# Patient Record
Sex: FEMALE | Race: WHITE | URBAN - METROPOLITAN AREA
[De-identification: names, ages, dates, MRNs, and addresses within clinical notes are randomized per-mention and may not be internally consistent; named-entity substitution may affect disease eponyms.]

---

## 2022-01-25 ENCOUNTER — OFFICE VISIT (OUTPATIENT)
Dept: FAMILY MEDICINE CLINIC | Facility: CLINIC | Age: 37
End: 2022-01-25
Payer: COMMERCIAL

## 2022-01-25 VITALS
SYSTOLIC BLOOD PRESSURE: 102 MMHG | BODY MASS INDEX: 30.25 KG/M2 | HEART RATE: 83 BPM | HEIGHT: 64 IN | DIASTOLIC BLOOD PRESSURE: 68 MMHG | WEIGHT: 177.19 LBS

## 2022-01-25 DIAGNOSIS — Z00.00 ROUTINE HEALTH MAINTENANCE: Primary | ICD-10-CM

## 2022-01-25 DIAGNOSIS — C44.320 SQUAMOUS CELL CARCINOMA OF FACE: ICD-10-CM

## 2022-01-25 DIAGNOSIS — G56.03 BILATERAL CARPAL TUNNEL SYNDROME: ICD-10-CM

## 2022-01-25 LAB
ALBUMIN SERPL-MCNC: 3.8 G/DL (ref 3.4–5)
ALBUMIN/GLOB SERPL: 1 {RATIO} (ref 1–2)
ALP LIVER SERPL-CCNC: 46 U/L
ALT SERPL-CCNC: 21 U/L
ANION GAP SERPL CALC-SCNC: 3 MMOL/L (ref 0–18)
AST SERPL-CCNC: 15 U/L (ref 15–37)
BILIRUB SERPL-MCNC: 0.2 MG/DL (ref 0.1–2)
BUN BLD-MCNC: 9 MG/DL (ref 7–18)
BUN/CREAT SERPL: 14.3 (ref 10–20)
CALCIUM BLD-MCNC: 9.2 MG/DL (ref 8.5–10.1)
CHLORIDE SERPL-SCNC: 107 MMOL/L (ref 98–112)
CHOLEST SERPL-MCNC: 193 MG/DL (ref ?–200)
CO2 SERPL-SCNC: 29 MMOL/L (ref 21–32)
CREAT BLD-MCNC: 0.63 MG/DL
FASTING PATIENT LIPID ANSWER: NO
FASTING STATUS PATIENT QL REPORTED: NO
GLOBULIN PLAS-MCNC: 4 G/DL (ref 2.8–4.4)
GLUCOSE BLD-MCNC: 96 MG/DL (ref 70–99)
HDLC SERPL-MCNC: 56 MG/DL (ref 40–59)
LDLC SERPL CALC-MCNC: 122 MG/DL (ref ?–100)
NONHDLC SERPL-MCNC: 137 MG/DL (ref ?–130)
OSMOLALITY SERPL CALC.SUM OF ELEC: 287 MOSM/KG (ref 275–295)
POTASSIUM SERPL-SCNC: 3.7 MMOL/L (ref 3.5–5.1)
PROT SERPL-MCNC: 7.8 G/DL (ref 6.4–8.2)
SODIUM SERPL-SCNC: 139 MMOL/L (ref 136–145)
TRIGL SERPL-MCNC: 85 MG/DL (ref 30–149)
TSI SER-ACNC: 1.09 MIU/ML (ref 0.36–3.74)
VLDLC SERPL CALC-MCNC: 15 MG/DL (ref 0–30)

## 2022-01-25 PROCEDURE — 99385 PREV VISIT NEW AGE 18-39: CPT | Performed by: FAMILY MEDICINE

## 2022-01-25 PROCEDURE — 3078F DIAST BP <80 MM HG: CPT | Performed by: FAMILY MEDICINE

## 2022-01-25 PROCEDURE — 3008F BODY MASS INDEX DOCD: CPT | Performed by: FAMILY MEDICINE

## 2022-01-25 PROCEDURE — 3074F SYST BP LT 130 MM HG: CPT | Performed by: FAMILY MEDICINE

## 2022-01-25 NOTE — PROGRESS NOTES
Yessica Aparicio is a 29year old female. Patient presents with:  Routine Physical      HPI:   Patient is a 43-year-old female presents today for routine health care. Patient has a lesion on her face and looks like a squamous cell carcinoma.   Patient is com no cyanosis, or edema    ASSESSMENT AND PLAN:   1. Bilateral carpal tunnel syndrome  Patient just started using braces when she sleeps that she will do that for 1 to 2 weeks and will make an appointment with the hand surgeon if needed.   - PLASTIC SURGERY -

## 2022-02-02 ENCOUNTER — OFFICE VISIT (OUTPATIENT)
Dept: SURGERY | Facility: CLINIC | Age: 37
End: 2022-02-02
Payer: COMMERCIAL

## 2022-02-02 VITALS
WEIGHT: 177 LBS | SYSTOLIC BLOOD PRESSURE: 110 MMHG | BODY MASS INDEX: 30.22 KG/M2 | DIASTOLIC BLOOD PRESSURE: 62 MMHG | HEIGHT: 64 IN

## 2022-02-02 DIAGNOSIS — L98.9 LESION OF SKIN OF FACE: ICD-10-CM

## 2022-02-02 DIAGNOSIS — D22.4 ATYPICAL MOLE OF NECK: Primary | ICD-10-CM

## 2022-02-02 PROCEDURE — 3078F DIAST BP <80 MM HG: CPT | Performed by: SURGERY

## 2022-02-02 PROCEDURE — 3008F BODY MASS INDEX DOCD: CPT | Performed by: SURGERY

## 2022-02-02 PROCEDURE — 11642 EXC F/E/E/N/L MAL+MRG 1.1-2: CPT | Performed by: SURGERY

## 2022-02-02 PROCEDURE — 3074F SYST BP LT 130 MM HG: CPT | Performed by: SURGERY

## 2022-02-02 PROCEDURE — 11421 EXC H-F-NK-SP B9+MARG 0.6-1: CPT | Performed by: SURGERY

## 2022-02-02 PROCEDURE — 99203 OFFICE O/P NEW LOW 30 MIN: CPT | Performed by: SURGERY

## 2022-02-02 NOTE — PROCEDURES
Surgery procedure note    Timeout performed and consent signed. Face prepped and draped with Betadine in a sterile fashion. 1% lidocaine with epinephrine is infiltrated circumferentially. The 1.5 cm lesion is marked with a skin scribe an elliptical incision is made with a conservative margin. Specimen oriented sent to pathology. The wound irrigated closed using interrupted 4-0 Prolene. Spot Band-Aid applied. Next the left neck prepped and draped with Betadine in a sterile fashion. Elliptical incision is made and a 1 cm atypical mole excised. Hemostasis maintained with compression incision closed using a single 4-0 Prolene. Sterile Band-Aid applied. She tolerated the procedure well.

## 2022-02-08 ENCOUNTER — OFFICE VISIT (OUTPATIENT)
Dept: SURGERY | Facility: CLINIC | Age: 37
End: 2022-02-08
Payer: COMMERCIAL

## 2022-02-08 DIAGNOSIS — T78.40XA ALLERGIC REACTION, INITIAL ENCOUNTER: ICD-10-CM

## 2022-02-08 DIAGNOSIS — C44.310 BASAL CELL CARCINOMA (BCC) OF SKIN OF FACE, UNSPECIFIED PART OF FACE: Primary | ICD-10-CM

## 2022-02-08 PROCEDURE — 99024 POSTOP FOLLOW-UP VISIT: CPT | Performed by: SURGERY

## 2022-02-08 NOTE — PROGRESS NOTES
Surgery progress note    Patient presents for follow-up discussion regarding the facial skin cancer that was excised. Pathology reveals basal cell carcinoma with close margin at 12 and 3:00. This will need to be reexcised. Plan to remove sutures today in follow-up for office excision. Extensive allergic reactions at both excision sites most likely secondary to the topical antibiotic. She was instructed to stop this to Claritin and use hydrocortisone ointment. Follow-up in 1 week.

## 2022-02-15 ENCOUNTER — OFFICE VISIT (OUTPATIENT)
Dept: SURGERY | Facility: CLINIC | Age: 37
End: 2022-02-15
Payer: COMMERCIAL

## 2022-02-15 VITALS — BODY MASS INDEX: 30.22 KG/M2 | HEIGHT: 64 IN | WEIGHT: 177 LBS

## 2022-02-15 DIAGNOSIS — Z98.890 POST-OPERATIVE STATE: Primary | ICD-10-CM

## 2022-02-15 PROCEDURE — 99213 OFFICE O/P EST LOW 20 MIN: CPT | Performed by: SURGERY

## 2022-02-15 NOTE — PROGRESS NOTES
Postoperative Patient Follow-up      2/15/2022    [facility]      Yessica Aparicio is a 29year old female postop excision of basal cell carcinoma of the face. She had a allergic reaction to the topical antibiotic that she was applying. She is here for wound evaluation. Margin at 12 and 3:00 were also close and requires reexcision. Exam  Rash is much improved.   Incision healing well      Assessment/Plan  Assessment   Post-operative state  (primary encounter diagnosis)    Follow-up in a few weeks when rash is completely healed for reexcision of facial basal cell         Diamond Koenig MD

## 2022-03-15 ENCOUNTER — OFFICE VISIT (OUTPATIENT)
Dept: SURGERY | Facility: CLINIC | Age: 37
End: 2022-03-15
Payer: COMMERCIAL

## 2022-03-15 VITALS
DIASTOLIC BLOOD PRESSURE: 54 MMHG | SYSTOLIC BLOOD PRESSURE: 104 MMHG | WEIGHT: 177 LBS | BODY MASS INDEX: 30.22 KG/M2 | HEIGHT: 64 IN

## 2022-03-15 DIAGNOSIS — C44.319 BASAL CELL CARCINOMA (BCC) OF SKIN OF OTHER PART OF FACE: Primary | ICD-10-CM

## 2022-03-15 PROCEDURE — 12051 INTMD RPR FACE/MM 2.5 CM/<: CPT | Performed by: SURGERY

## 2022-03-15 PROCEDURE — 11641 EXC F/E/E/N/L MAL+MRG 0.6-1: CPT | Performed by: SURGERY

## 2022-03-15 NOTE — PROCEDURES
Surgery procedure note    Patient is here for reexcision of basal cell carcinoma of the right medial cheek. Informed consent obtained. Timeout performed and face prepped with pHisoHex. Lidocaine with epinephrine is infiltrated. An elliptical incision is made encompassing the area of concern and old scar. The flaps are elevated the wound closed in a layered fashion using interrupted 4-0 Monocryl and fine Steri-Strips. Tolerated the procedure well. Area of excision measures 1 cm x 8 mm.

## 2022-03-16 PROCEDURE — 88305 TISSUE EXAM BY PATHOLOGIST: CPT | Performed by: SURGERY

## 2022-03-18 ENCOUNTER — TELEPHONE (OUTPATIENT)
Dept: SURGERY | Facility: CLINIC | Age: 37
End: 2022-03-18

## 2022-03-18 NOTE — TELEPHONE ENCOUNTER
Per pt her bandage came off her face and states a flap of skin came off and asking if it is ok to wait until Monday.  Please advise

## 2022-03-18 NOTE — TELEPHONE ENCOUNTER
Spoke to patient. Keep incision site clean, and cover with antibiotic ointment and bandaid. Scheduled apt to RTC to evaluate incision site on Tuesday @ 7:45 am in OP. Offered apt for Monday, but patient prefers OP location.

## 2022-03-22 ENCOUNTER — OFFICE VISIT (OUTPATIENT)
Dept: SURGERY | Facility: CLINIC | Age: 37
End: 2022-03-22
Payer: COMMERCIAL

## 2022-03-22 VITALS — BODY MASS INDEX: 30.22 KG/M2 | HEIGHT: 64 IN | WEIGHT: 177 LBS

## 2022-03-22 DIAGNOSIS — Z98.890 POST-OPERATIVE STATE: Primary | ICD-10-CM

## 2022-03-22 PROCEDURE — 99024 POSTOP FOLLOW-UP VISIT: CPT | Performed by: SURGERY

## 2022-03-22 PROCEDURE — 3008F BODY MASS INDEX DOCD: CPT | Performed by: SURGERY

## 2022-03-22 NOTE — PROGRESS NOTES
Postoperative Patient Follow-up      3/22/2022    [facility]      Adalberto Silva is a 29year old female postop after reexcision basal cell carcinoma of the face. Pathology checked and no residual cancer.       Exam  Incision is clean dry intact      Assessment/Plan  Assessment   Post-operative state  (primary encounter diagnosis)    Follow-up for problem         Susan Pitt MD

## 2022-04-05 ENCOUNTER — TELEPHONE (OUTPATIENT)
Dept: SURGERY | Facility: CLINIC | Age: 37
End: 2022-04-05

## 2022-04-06 ENCOUNTER — OFFICE VISIT (OUTPATIENT)
Dept: SURGERY | Facility: CLINIC | Age: 37
End: 2022-04-06
Payer: COMMERCIAL

## 2022-04-06 VITALS — HEIGHT: 64 IN | WEIGHT: 177 LBS | BODY MASS INDEX: 30.22 KG/M2

## 2022-04-06 DIAGNOSIS — T81.41XA POSTOPERATIVE STITCH ABSCESS: Primary | ICD-10-CM

## 2022-04-06 PROCEDURE — 99212 OFFICE O/P EST SF 10 MIN: CPT | Performed by: SURGERY

## 2022-04-06 PROCEDURE — 3008F BODY MASS INDEX DOCD: CPT | Performed by: SURGERY

## 2022-04-06 NOTE — PROGRESS NOTES
Surgery progress note    Tristan Shanks presents with a stitch abscess of the face after excision of a basal cell carcinoma. A 4-0 Monocryl is removed and a scant amount of pus drained from around the site. She has hypersensitivity to antibiotics and will treat with daily dressing changes. She tolerated this well.   Follow-up  As needed

## 2022-10-14 ENCOUNTER — OFFICE VISIT (OUTPATIENT)
Dept: FAMILY MEDICINE CLINIC | Facility: CLINIC | Age: 37
End: 2022-10-14
Payer: COMMERCIAL

## 2022-10-14 VITALS
RESPIRATION RATE: 14 BRPM | HEIGHT: 64 IN | SYSTOLIC BLOOD PRESSURE: 120 MMHG | WEIGHT: 194 LBS | DIASTOLIC BLOOD PRESSURE: 72 MMHG | OXYGEN SATURATION: 96 % | BODY MASS INDEX: 33.12 KG/M2 | HEART RATE: 88 BPM

## 2022-10-14 DIAGNOSIS — J06.9 VIRAL UPPER RESPIRATORY TRACT INFECTION: ICD-10-CM

## 2022-10-14 DIAGNOSIS — J06.9 VIRAL UPPER RESPIRATORY TRACT INFECTION: Primary | ICD-10-CM

## 2022-10-14 PROCEDURE — 3008F BODY MASS INDEX DOCD: CPT | Performed by: FAMILY MEDICINE

## 2022-10-14 PROCEDURE — 99213 OFFICE O/P EST LOW 20 MIN: CPT | Performed by: FAMILY MEDICINE

## 2022-10-14 PROCEDURE — 3074F SYST BP LT 130 MM HG: CPT | Performed by: FAMILY MEDICINE

## 2022-10-14 PROCEDURE — 3078F DIAST BP <80 MM HG: CPT | Performed by: FAMILY MEDICINE

## 2022-10-14 RX ORDER — FLUTICASONE PROPIONATE 50 MCG
SPRAY, SUSPENSION (ML) NASAL
Qty: 48 G | Refills: 0 | OUTPATIENT
Start: 2022-10-14

## 2022-10-14 RX ORDER — BENZONATATE 200 MG/1
200 CAPSULE ORAL 3 TIMES DAILY PRN
Qty: 30 CAPSULE | Refills: 0 | Status: SHIPPED | OUTPATIENT
Start: 2022-10-14

## 2022-10-14 RX ORDER — FLUTICASONE PROPIONATE 50 MCG
2 SPRAY, SUSPENSION (ML) NASAL DAILY
Qty: 16 G | Refills: 0 | Status: SHIPPED | OUTPATIENT
Start: 2022-10-14

## 2022-10-14 RX ORDER — CODEINE PHOSPHATE AND GUAIFENESIN 10; 100 MG/5ML; MG/5ML
10 SOLUTION ORAL EVERY 6 HOURS PRN
Qty: 140 ML | Refills: 0 | Status: SHIPPED | OUTPATIENT
Start: 2022-10-14

## 2024-02-02 ENCOUNTER — HOSPITAL ENCOUNTER (OUTPATIENT)
Age: 39
Discharge: HOME | End: 2024-02-02
Payer: COMMERCIAL

## 2024-02-02 VITALS
SYSTOLIC BLOOD PRESSURE: 125 MMHG | RESPIRATION RATE: 18 BRPM | DIASTOLIC BLOOD PRESSURE: 75 MMHG | TEMPERATURE: 99 F | HEART RATE: 99 BPM | OXYGEN SATURATION: 100 %

## 2024-02-02 DIAGNOSIS — Z20.822 ENCOUNTER FOR LABORATORY TESTING FOR COVID-19 VIRUS: ICD-10-CM

## 2024-02-02 DIAGNOSIS — U07.1 COVID-19 VIRUS DETECTED: Primary | ICD-10-CM

## 2024-02-02 LAB — SARS-COV-2 RNA RESP QL NAA+PROBE: DETECTED

## 2024-02-02 PROCEDURE — 99203 OFFICE O/P NEW LOW 30 MIN: CPT | Performed by: NURSE PRACTITIONER

## 2024-02-02 PROCEDURE — U0002 COVID-19 LAB TEST NON-CDC: HCPCS | Performed by: NURSE PRACTITIONER

## 2024-02-02 NOTE — ED INITIAL ASSESSMENT (HPI)
Pt here with complaints of cough , body aches, and headaches that began 1 day ago , pt states she tested +covid at home today , pt denies any fevers

## 2024-02-02 NOTE — ED PROVIDER NOTES
Patient Seen in: [facility]      History   No chief complaint on file.    Stated Complaint: Covid +    Subjective:   Well-appearing 38-year-old female with no significant past medical history presents with complaints of a cough, body aches and intermittent headaches that started yesterday.  Patient communicates taking Tylenol for symptoms with relief.  Patient communicates that today morning she tested positive for COVID-19 on an  home test.  Patient denies fever or chills.  Patient denies chest pain or shortness of breath.              Objective:   History reviewed. No pertinent past medical history.           Past Surgical History:   Procedure Laterality Date    APPENDECTOMY      Age 17      2019    33 wks gestation       2022                Social History     Socioeconomic History    Marital status:     Number of children: 2   Tobacco Use    Smoking status: Never    Smokeless tobacco: Never   Vaping Use    Vaping Use: Never used   Substance and Sexual Activity    Alcohol use: Yes     Comment: \"couple glasses a month\"    Drug use: Never              Review of Systems    Positive for stated complaint: Covid +  Other systems are as noted in HPI.  Constitutional and vital signs reviewed.      All other systems reviewed and negative except as noted above.    Physical Exam     ED Triage Vitals   BP 24 0816 125/75   Pulse 24 0816 99   Resp 24 0816 18   Temp 24 0818 98.5 °F (36.9 °C)   Temp src --    SpO2 24 0816 100 %   O2 Device 24 0833 None (Room air)       Current:/75   Pulse 99   Temp 98.5 °F (36.9 °C)   Resp 18   LMP 2024   SpO2 100%       Physical Exam  VS: Vital signs reviewed. 02 saturation within normal limits for this patient.    General: Patient is awake and alert, oriented to person, place and time. Pt appears non-toxic.     HEENT: Head is normocephalic, atraumatic.  Nonicteric sclera, no  conjunctival injection. No facial droop or slurred speech. No oral lesions or pallor. Mucous membranes moist.     Neck: No cervical lymphadenopathy. Supple. Normal ROM.    Heart: Regular rate and rhythm, normal S1, normal S2.    Lungs: Clear to auscultation. Good inspiratory effort. + Airway entry bilaterally without wheezes, rhonchi or crackles. No accessory muscle use or tachypnea.    Skin: Warm, dry and normal in color.     Psychiatric: Normal affect, judgement normal, insight normal.     CNS: Moves all 4 extremities. Interacts appropriately. No gait abnormality. Memory normal.        ED Course     Labs Reviewed   RAPID SARS-COV-2 BY PCR - Abnormal; Notable for the following components:       Result Value    Rapid SARS-CoV-2 by PCR Detected (*)     All other components within normal limits     MDM   Medical Decision Making  Well-appearing.  Rapid SARS-CoV-2 positive.   O2 saturation 100%.  No respiratory distress.    COVID-19 counseling performed.  I discussed quarantine precautions.  I reviewed the signs and symptoms of COVID-19.  I discussed when to seek higher level of care and social isolation.    Patient has been advised that they should continue to self isolate and use infection control measures (e.g. wear mask, isolate, social distance, avoid sharing personal items, clean and disinfect high touch surfaces, and frequent handwashing).     Paxlovid was prescribed.  CMP from January 25, 2022 within normal limits.  I discussed rebound effects with Paxlovid, patient verbalized understanding.  Continue acetaminophen and/or ibuprofen as needed for pain or discomfort.    Differential diagnosis considered were viral URI versus influenza versus COVID-19.    Return if any concerns.      Problems Addressed:  COVID-19 virus detected: acute illness or injury  Encounter for laboratory testing for COVID-19 virus: acute illness or injury    Amount and/or Complexity of Data Reviewed  Labs: ordered. Decision-making details  documented in ED Course.    Risk  OTC drugs.  Prescription drug management.        Disposition and Plan     Clinical Impression:  1. COVID-19 virus detected    2. Encounter for laboratory testing for COVID-19 virus         Disposition:  Discharge  2/2/2024  8:32 am    Follow-up:  Ina Oreilly MD  [address] [place] [id number]  [phone]    In 1 week  As needed          Medications Prescribed:  Discharge Medication List as of 2/2/2024  8:33 AM        START taking these medications    Details   nirmatrelvir-ritonavir 300-100 MG Oral Tablet Therapy Pack Take two nirmatrelvir tablets (300mg) with one ritonavir tablet (100mg) together twice daily for 5 days., Normal, Disp-30 tablet, R-0

## 2024-10-02 ENCOUNTER — OFFICE VISIT (OUTPATIENT)
Dept: INTERNAL MEDICINE CLINIC | Facility: CLINIC | Age: 39
End: 2024-10-02
Payer: COMMERCIAL

## 2024-10-02 VITALS
BODY MASS INDEX: 35 KG/M2 | HEIGHT: 64 IN | WEIGHT: 205 LBS | DIASTOLIC BLOOD PRESSURE: 70 MMHG | OXYGEN SATURATION: 98 % | HEART RATE: 75 BPM | SYSTOLIC BLOOD PRESSURE: 120 MMHG | TEMPERATURE: 98 F | RESPIRATION RATE: 18 BRPM

## 2024-10-02 DIAGNOSIS — Z13.29 SCREENING FOR ENDOCRINE, METABOLIC AND IMMUNITY DISORDER: ICD-10-CM

## 2024-10-02 DIAGNOSIS — Z13.228 SCREENING FOR ENDOCRINE, METABOLIC AND IMMUNITY DISORDER: ICD-10-CM

## 2024-10-02 DIAGNOSIS — Z13.29 SCREENING FOR THYROID DISORDER: ICD-10-CM

## 2024-10-02 DIAGNOSIS — Z13.220 SCREENING FOR LIPID DISORDERS: ICD-10-CM

## 2024-10-02 DIAGNOSIS — Z00.00 PHYSICAL EXAM, ANNUAL: Primary | ICD-10-CM

## 2024-10-02 DIAGNOSIS — Z13.1 SCREENING FOR DIABETES MELLITUS: ICD-10-CM

## 2024-10-02 DIAGNOSIS — Z11.59 NEED FOR HEPATITIS B SCREENING TEST: ICD-10-CM

## 2024-10-02 DIAGNOSIS — Z12.4 SCREENING FOR MALIGNANT NEOPLASM OF CERVIX: ICD-10-CM

## 2024-10-02 DIAGNOSIS — Z13.0 SCREENING FOR DEFICIENCY ANEMIA: ICD-10-CM

## 2024-10-02 DIAGNOSIS — Z11.59 NEED FOR HEPATITIS C SCREENING TEST: ICD-10-CM

## 2024-10-02 DIAGNOSIS — Z13.0 SCREENING FOR ENDOCRINE, METABOLIC AND IMMUNITY DISORDER: ICD-10-CM

## 2024-10-02 DIAGNOSIS — E55.9 VITAMIN D DEFICIENCY: ICD-10-CM

## 2024-10-02 DIAGNOSIS — Z11.4 SCREENING FOR HIV (HUMAN IMMUNODEFICIENCY VIRUS): ICD-10-CM

## 2024-10-02 PROCEDURE — 87624 HPV HI-RISK TYP POOLED RSLT: CPT | Performed by: STUDENT IN AN ORGANIZED HEALTH CARE EDUCATION/TRAINING PROGRAM

## 2024-10-02 PROCEDURE — 88175 CYTOPATH C/V AUTO FLUID REDO: CPT | Performed by: STUDENT IN AN ORGANIZED HEALTH CARE EDUCATION/TRAINING PROGRAM

## 2024-10-02 NOTE — PROGRESS NOTES
[facility]    CHIEF COMPLAINT:   Chief Complaint   Patient presents with    Routine Physical     New patient         HPI:   Cleopatra Fernando is a 39 year old female who presents for a complete physical exam.      Patient is originally from [place], lived in several other states, moved to [place] about 2-1/2 years ago.  She is  for the past 8 years, has 5-year-old daughter and 3-year-old son.    Her GAD7 - 7 and PHQ9 - 5 in the office today, however patient denies any interference or problems in her daily life.    Diet: Heathy    Exercise:  4 days a week 1 hour each (orange theory)  Eye exam: every 2 years, wears glasses, no contacts   Dentist: 2 years ago. Due, currently searching for dentist  Driving: yes, with the seatbelt  Sunscreen: yes throughout the year  Smoking: Never  Alcohol: Occasionally  No other substance use.   Periods irregular, last  5-9 days, every  4-8 weeks, heavy on the first few days, no bleeding/spotting between periods.  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  Both deliveries were with . Fallopian tubes were removed during last .    Vaccinations:  Influenza: Got it done [facility] today 10/02/2024  COVID: Vaccinated  x 6, got it today in [facility] 10/02/2024  Tdap/Td: Had it done during pregnancy about 3 years ago    Screenings:  Mammogram:   Colonoscopy screen: Dad had (carcinoid tumor) around 64 yo, night need colonoscopy at 42 yo  PAP: about 3 years ago, will repeat in the office today.    Wt Readings from Last 6 Encounters:   10/02/24 205 lb (93 kg)   10/14/22 194 lb (88 kg)   22 177 lb (80.3 kg)   22 177 lb (80.3 kg)   03/15/22 177 lb (80.3 kg)   02/15/22 177 lb (80.3 kg)     Body mass index is 35.19 kg/m².       No current outpatient medications on file.      History reviewed. No pertinent past medical history.   Past Surgical History:   Procedure Laterality Date    Appendectomy       Age 17      2019    33 wks gestation       2022      Family History   Problem Relation Age of Onset    Cancer Father     Diabetes Maternal Grandmother            REVIEW OF SYSTEMS:   GENERAL: feels well otherwise  SKIN: denies any unusual skin lesions  EYES:denies blurred vision or double vision  HEENT: denies nasal congestion, sinus pain or ST  LUNGS: denies shortness of breath with exertion  CARDIOVASCULAR: denies chest pain on exertion  GI: denies abdominal pain,denies heartburn  : denies dysuria, vaginal discharge or itching,   MUSCULOSKELETAL: denies back pain  NEURO: denies headaches  PSYCHE: denies depression or anxiety  HEMATOLOGIC: denies hx of anemia  ENDOCRINE: denies thyroid history  ALL/ASTHMA: denies hx of allergy or asthma    EXAM:   /70 (BP Location: Right arm, Patient Position: Sitting, Cuff Size: adult)   Pulse 75   Temp 98.1 °F (36.7 °C)   Resp 18   Ht 5' 4\" (1.626 m)   Wt 205 lb (93 kg)   LMP 2024   SpO2 98%   BMI 35.19 kg/m²   Body mass index is 35.19 kg/m².   GENERAL: well developed, well nourished,in no apparent distress  SKIN: no rashes,no suspicious lesions  HEENT: atraumatic, normocephalic,ears and throat are clear  EYES:PERRLA, conjunctiva are clear  NECK: supple,no adenopathy  LUNGS: clear to auscultation  CARDIO: nl s1 and s2, RRR without murmur  GI: good BS's,no masses, HSM or tenderness  BREAST: no dominant or suspicious mass, no nipple discharge  GENITAL/URINARY:  External Genitalia:  General appearance; normal, Hair distribution; normal, Lesions absent, Urethral Meatus:  Size normal, Location normal, Lesions absent, Prolapse absent, Vagina:  General appearance normal, Lesions absent, Pelvic support normal, Cervix:  General appearance normal, Discharge absent, Tenderness absent, Enlargement absent, Uterus:  Masses absent, Adnexa:  Masses absent, Tenderness absent, Anus/Perineum:  Lesions absent and Masses absent  MUSCULOSKELETAL:  back is not tender,FROM of the back  EXTREMITIES: no cyanosis, clubbing or edema  NEURO: Oriented times three,cranial nerves are intact,motor and sensory are grossly intact    Labs:   No results found for: \"WBC\", \"HGB\", \"PLT\"   Lab Results   Component Value Date/Time    GLU 96 01/25/2022 05:12 PM     01/25/2022 05:12 PM    K 3.7 01/25/2022 05:12 PM     01/25/2022 05:12 PM    CO2 29.0 01/25/2022 05:12 PM    CREATSERUM 0.63 01/25/2022 05:12 PM    CA 9.2 01/25/2022 05:12 PM    ALB 3.8 01/25/2022 05:12 PM    TP 7.8 01/25/2022 05:12 PM    ALKPHO 46 01/25/2022 05:12 PM    AST 15 01/25/2022 05:12 PM    ALT 21 01/25/2022 05:12 PM    BILT 0.2 01/25/2022 05:12 PM    TSH 1.090 01/25/2022 05:12 PM        Lab Results   Component Value Date/Time    CHOLEST 193 01/25/2022 05:12 PM    HDL 56 01/25/2022 05:12 PM    TRIG 85 01/25/2022 05:12 PM     (H) 01/25/2022 05:12 PM    NONHDLC 137 (H) 01/25/2022 05:12 PM       No results found for: \"A1C\"   Vitamin D:    No results found for: \"VITD\"        ASSESSMENT AND PLAN:   Cleopatra Fernando is a 39 year old female who presents for a complete physical exam.     1. Physical exam, annual  Pap, pelvic, and breast exam done in the office today. Self breast exam explained. Pt' s weight is Body mass index is 35.19 kg/m². Recommended regular exercise.   Age appropriate screenings discussed and orders placed.  The patient indicates understanding of these issues and agrees to the plan.  Annual eye exam and Q 6 month dental exam recommended    2. Screening for deficiency anemia  - CBC With Differential With Platelet; Future    3. Screening for endocrine, metabolic and immunity disorder  - Comp Metabolic Panel (14); Future    4. Screening for lipid disorders  - Lipid Panel; Future    5. Screening for diabetes mellitus  - Hemoglobin A1C; Future    6. Screening for thyroid disorder  - TSH W Reflex To Free T4; Future    7. Vitamin D deficiency  - Vitamin D; Future    8. Need for hepatitis C  screening test  - HCV Antibody; Future    9. Need for hepatitis B screening test  - Hepatitis B Surface Antibody; Future  - Hepatitis B Surface Antigen; Future    10. Screening for HIV (human immunodeficiency virus)  - HIV Ag/Ab Combo; Future       Return in about 4 weeks (around 10/30/2024) for For weight loss appointment.    Sima Crowder MD

## 2024-10-08 LAB
.: NORMAL
.: NORMAL
HPV E6+E7 MRNA CVX QL NAA+PROBE: NEGATIVE

## 2025-04-30 NOTE — PROGRESS NOTES
OFFICE NOTE       The following individual(s) verbally consented to be recorded using ambient AI listening technology and understand that they can each withdraw their consent to this listening technology at any point by asking the clinician to turn off or pause the recording:    Patient name: Cleopatra Fernando          Patient ID: Cleopatra Fernando is a 39 year old female.  Today's Date: 04/30/25  Chief Complaint: Cough (Started almost 2 weeks ago, denies any congestion or fevers, pt states she's been taking nyquil ) and Weight Loss (Pt would like to discuss weight loss options )      History of Present Illness  Cleopatra Fernando is a 39 year old female who presents with a persistent cough following a viral infection.    She has been experiencing a persistent cough that began after a cold contracted the weekend before Easter. The initial infection included fever and lethargy, which resolved within 24 to 48 hours, but the cough has persisted since then.    Initially, the cough was slightly productive but has since become non-productive and is described as 'annoying'. It is most pronounced at night, often waking her from sleep, but continues throughout the day. She has tried Nyquil at night, which may have helped slightly, but she still wakes up during the night.    No chest tightness, shortness of breath, wheezing, chest pain, abdominal pain, nausea, vomiting, diarrhea, constipation, ear congestion, pain, or hearing loss. She reports a sore throat due to coughing, but her voice has returned after a brief period of loss during the initial infection. She notes a little nasal congestion but nothing out of the ordinary, and denies any eye itchiness, redness, or vision changes. She has not experienced any fatigue, fevers, chills, appetite changes, or rash. Her urination is normal, and she has no muscle or joint aches.    She also wants to discuss medications for weight loss. She has been training  in the gym regularly, has been counting calories using [employer] norberto ranging 1500- 1800 calories a day. She has not lost much weight and would like to investigate options to help her lose weight.       Vitals:    04/30/25 0828   BP: 118/62   Pulse: 75   Resp: 18   Temp: 98 °F (36.7 °C)   SpO2: 99%   Weight: 195 lb (88.5 kg)   Height: 5' 4\" (1.626 m)     body mass index is 33.47 kg/m².  BP Readings from Last 3 Encounters:   04/30/25 118/62   10/02/24 120/70   02/02/24 125/75     The ASCVD Risk score (Saúl OWENS, et al., 2019) failed to calculate for the following reasons:    The 2019 ASCVD risk score is only valid for ages 40 to 79  Results         Medications reviewed:  Current Medications[1]      Assessment & Plan  Post-viral cough  Persistent non-productive cough following a viral infection that began the week before Easter, most pronounced at night. No fever, chills, fatigue, or sputum production. Lungs are clear with no wheezing or crackles. Symptoms align with a post-viral cough, typically lasting 6-8 weeks. No indication for chest x-ray or inhaler due to lack of worsening symptoms.  - Prescribe Robitussin with codeine for nighttime cough suppression to aid sleep.  - Prescribe Tessalon Perles for daytime cough suppression.  - Advise use of lozenges every 2-3 hours.  - Recommend salt water gargles throughout the day.  - Advise against mixing Nyquil with prescribed nighttime medication.  - Suggest using Delsym during the day if Nyquil causes drowsiness.    Weight loss  Will need to review med options and if interested - come back for the appointment ot begin the medication. Info provided.       Follow Up: As needed/if symptoms worsen    I spent 25  minutes obtaining pertitent medical history, reviewing pertinent imaging/labs and specialists notes, evaluating patient, discussing differential diagnosis' and various treatment options, reinforcing importance of compliance with treatment plan, and completing  documentation.       Objective/ Results:   Physical Exam  Vitals reviewed.   Constitutional:       General: She is not in acute distress.     Appearance: Normal appearance. She is not ill-appearing.   HENT:      Head: Normocephalic and atraumatic.      Right Ear: Tympanic membrane, ear canal and external ear normal.      Left Ear: Tympanic membrane, ear canal and external ear normal.      Nose: No congestion or rhinorrhea.   Eyes:      Extraocular Movements: Extraocular movements intact.      Conjunctiva/sclera: Conjunctivae normal.      Pupils: Pupils are equal, round, and reactive to light.   Cardiovascular:      Rate and Rhythm: Normal rate and regular rhythm.      Heart sounds: No murmur heard.     No gallop.   Pulmonary:      Effort: Pulmonary effort is normal. No respiratory distress.      Breath sounds: Normal breath sounds. No stridor. No wheezing, rhonchi or rales.   Musculoskeletal:      Cervical back: Normal range of motion.      Right lower leg: No edema.      Left lower leg: No edema.   Skin:     General: Skin is warm.      Capillary Refill: Capillary refill takes less than 2 seconds.   Neurological:      General: No focal deficit present.      Mental Status: She is alert and oriented to person, place, and time.   Psychiatric:         Mood and Affect: Mood normal.         Behavior: Behavior normal.         Thought Content: Thought content normal.         Judgment: Judgment normal.        Physical Exam  HEENT: Throat normal, no inflammation. Cervical lymph nodes normal. Ears normal. Geographic tongue, blotchy appearance.  CHEST: Lungs clear to auscultation bilaterally.     Reviewed:    There are no active problems to display for this patient.     Allergies[2]   Short Social Hx on File[3]   Review of Systems   Constitutional:  Negative for appetite change, chills, fatigue and fever.   HENT:  Positive for congestion. Negative for ear discharge, ear pain, sinus pressure, sinus pain, sore throat and voice  change.    Eyes:  Negative for discharge, redness, itching and visual disturbance.   Respiratory:  Positive for cough. Negative for chest tightness, shortness of breath and wheezing.    Gastrointestinal:  Negative for abdominal pain, constipation, diarrhea, nausea and vomiting.   Genitourinary: Negative.    Musculoskeletal:  Negative for arthralgias and joint swelling.   Skin: Negative.        All other systems negative unless otherwise stated in ROS or HPI above.       Sima Crowder MD  Internal Medicine       Call office with any questions or seek emergency care if necessary.   Patient understands and agrees to follow directions and advice.      ----------------------------------------- PATIENT INSTRUCTIONS-----------------------------------------     There are no Patient Instructions on file for this visit.        The 21st Century Cures Act makes medical notes available to patients in the interest of transparency.  However, please be advised that this is a medical document.  It is intended as a peer to peer communication.  It is written in medical language and may contain abbreviations or verbiage that are technical and unfamiliar.  It may appear blunt or direct.  Medical documents are intended to carry relevant information, facts as evident, and the clinical opinion of the practitioner.          [1]   Current Outpatient Medications   Medication Sig Dispense Refill    ergocalciferol 1.25 MG (51464 UT) Oral Cap Take 1 capsule (50,000 Units total) by mouth once a week for 12 weeks. (Patient not taking: Reported on 4/30/2025) 12 capsule 0   [2] No Known Allergies  [3]   Social History  Socioeconomic History    Marital status:     Number of children: 2   Tobacco Use    Smoking status: Never    Smokeless tobacco: Never   Vaping Use    Vaping status: Never Used   Substance and Sexual Activity    Alcohol use: Yes     Comment: \"couple glasses a month\"    Drug use: Never

## 2025-04-30 NOTE — PATIENT INSTRUCTIONS
VISIT SUMMARY:  Today, you were seen for a persistent cough that started after a viral infection you had the weekend before Easter. The initial symptoms of fever and lethargy have resolved, but the cough has continued, especially at night, disturbing your sleep. You have no other significant symptoms like chest pain, shortness of breath, or wheezing.    YOUR PLAN:  -POST-VIRAL COUGH: A post-viral cough is a cough that persists after the symptoms of a viral infection have resolved, typically lasting 6-8 weeks. Your lungs are clear, and there are no signs of a more serious condition. To help manage your symptoms, I have prescribed Robitussin with codeine for nighttime cough suppression to help you sleep and Tessalon Perles for daytime cough suppression. You should also use lozenges every 2-3 hours and do salt water gargles throughout the day.Consider using Delsym during the day .    INSTRUCTIONS:  Please follow the prescribed medication regimen and the additional recommendations provided. If your symptoms worsen or do not improve within the expected timeframe, schedule a follow-up appointment.    Contains text generated by Megan